# Patient Record
Sex: MALE | Race: WHITE | Employment: FULL TIME | URBAN - METROPOLITAN AREA
[De-identification: names, ages, dates, MRNs, and addresses within clinical notes are randomized per-mention and may not be internally consistent; named-entity substitution may affect disease eponyms.]

---

## 2018-07-13 ENCOUNTER — OFFICE VISIT (OUTPATIENT)
Dept: FAMILY MEDICINE CLINIC | Age: 33
End: 2018-07-13

## 2018-07-13 VITALS
DIASTOLIC BLOOD PRESSURE: 78 MMHG | SYSTOLIC BLOOD PRESSURE: 102 MMHG | BODY MASS INDEX: 22.35 KG/M2 | HEART RATE: 88 BPM | RESPIRATION RATE: 16 BRPM | HEIGHT: 72 IN | WEIGHT: 165 LBS | OXYGEN SATURATION: 96 %

## 2018-07-13 DIAGNOSIS — Z00.00 ANNUAL PHYSICAL EXAM: Primary | ICD-10-CM

## 2018-07-13 DIAGNOSIS — G47.9 SLEEP DISTURBANCE: Primary | ICD-10-CM

## 2018-07-13 DIAGNOSIS — R10.9 CHRONIC ABDOMINAL PAIN: ICD-10-CM

## 2018-07-13 DIAGNOSIS — G89.29 CHRONIC ABDOMINAL PAIN: ICD-10-CM

## 2018-07-13 PROCEDURE — 99203 OFFICE O/P NEW LOW 30 MIN: CPT | Performed by: FAMILY MEDICINE

## 2018-07-13 ASSESSMENT — PATIENT HEALTH QUESTIONNAIRE - PHQ9
5. POOR APPETITE OR OVEREATING: 3
6. FEELING BAD ABOUT YOURSELF - OR THAT YOU ARE A FAILURE OR HAVE LET YOURSELF OR YOUR FAMILY DOWN: 3
4. FEELING TIRED OR HAVING LITTLE ENERGY: 3
3. TROUBLE FALLING OR STAYING ASLEEP: 3
SUM OF ALL RESPONSES TO PHQ QUESTIONS 1-9: 18
9. THOUGHTS THAT YOU WOULD BE BETTER OFF DEAD, OR OF HURTING YOURSELF: 1
SUM OF ALL RESPONSES TO PHQ9 QUESTIONS 1 & 2: 5
1. LITTLE INTEREST OR PLEASURE IN DOING THINGS: 3
10. IF YOU CHECKED OFF ANY PROBLEMS, HOW DIFFICULT HAVE THESE PROBLEMS MADE IT FOR YOU TO DO YOUR WORK, TAKE CARE OF THINGS AT HOME, OR GET ALONG WITH OTHER PEOPLE: 1
8. MOVING OR SPEAKING SO SLOWLY THAT OTHER PEOPLE COULD HAVE NOTICED. OR THE OPPOSITE, BEING SO FIGETY OR RESTLESS THAT YOU HAVE BEEN MOVING AROUND A LOT MORE THAN USUAL: 0
2. FEELING DOWN, DEPRESSED OR HOPELESS: 2
7. TROUBLE CONCENTRATING ON THINGS, SUCH AS READING THE NEWSPAPER OR WATCHING TELEVISION: 0

## 2018-07-13 ASSESSMENT — ENCOUNTER SYMPTOMS
ABDOMINAL PAIN: 1
ABDOMINAL DISTENTION: 1
NAUSEA: 1

## 2018-07-13 NOTE — PROGRESS NOTES
7/13/2018    This is a 33 y.o. male   Chief Complaint   Patient presents with    Established New Doctor     Depression, Insomnia and has had pain and nausea in stomach has been to [facility] about 5 years ago for this but is getting bad again   . HPI  Pt presents today as a new pt to establish a PCP. His past medical history includes allergic rhinitis, anxiety, bipolar disorder, chronic back pain, depression, GERD. States that he has issues falling and staying asleep, averages 2-3 hours of sleep. Has tried melatonin a few times but doesn't want to be dependent on it. Also states that he has abdominal pain from childhood that came and went, then in from 2010 to 2015 was dx with leaky gut syndrome and went on a strict diet but still felt sick, was told his body is not able to absorb certain nutrients and that his body is in a mild fight or flight mode, had improvement starting in 2015 (had changed jobs to a less stress job), worse over the last 6 months and also having some relationship anxiety. Feels a mild queasiness to punched in the stomach, mostly mild queasy. In 2010 XRAY showed that his stomach was very superior and that his colon was swollen, had colonoscopy which was negative in 2010, also negative EGD in 2011. Also states that he has had blotchy rashes on his stomach, legs, and arms. Last occurence was in 2014. Also has had periodic testicular pain since his teenage years. [address]  which incorporates endocrinology and is seeing if his insurance will cover the 60 panel lab they want to do.     Past Medical History:   Diagnosis Date    Allergic rhinitis     Anxiety     Bipolar disorder (HCC)     Chronic back pain     Depression     GERD (gastroesophageal reflux disease)     Headache        Past Surgical History:   Procedure Laterality Date    WISDOM TOOTH EXTRACTION  2013       Social History     Social History    Marital status: Unknown     Spouse name: N/A    Number of children: N/A    Years of education: N/A     Occupational History    Not on file. Social History Main Topics    Smoking status: Never Smoker    Smokeless tobacco: Never Used    Alcohol use Yes      Comment: 2-3 drinks    Drug use: No    Sexual activity: No     Other Topics Concern    Not on file     Social History Narrative    No narrative on file       Family History   Problem Relation Age of Onset    Arthritis Mother     Other Mother     Diabetes Father        No current outpatient prescriptions on file. No current facility-administered medications for this visit. There is no immunization history on file for this patient. Allergies   Allergen Reactions    Penicillins     Benadryl [Diphenhydramine] Nausea And Vomiting and Rash       No results found for any previous visit. Review of Systems   Constitutional: Positive for appetite change. Gastrointestinal: Positive for abdominal distention (periodic bloating), abdominal pain (mostly mild queasiness ) and nausea. Genitourinary: Positive for testicular pain (every few months since teenage years). Psychiatric/Behavioral: Positive for dysphoric mood and sleep disturbance. /78 (Site: Left Arm, Position: Sitting, Cuff Size: Medium Adult)   Pulse 88   Resp 16   Ht 6' (1.829 m)   Wt 165 lb (74.8 kg)   SpO2 96%   BMI 22.38 kg/m²     Physical Exam   Constitutional: He is oriented to person, place, and time. He appears well-developed and well-nourished. HENT:   Head: Normocephalic and atraumatic. Eyes: EOM are normal. Pupils are equal, round, and reactive to light. Neck: Normal range of motion. Cardiovascular: Normal rate, regular rhythm and normal heart sounds. Pulmonary/Chest: Effort normal and breath sounds normal.   Abdominal: Bowel sounds are normal. There is no tenderness. Neurological: He is alert and oriented to person, place, and time. Psychiatric: He has a normal mood and affect.  His behavior is normal. Judgment and thought content normal.       Plan   Diagnosis Orders   1. Sleep disturbance  Wayne Baltazar MD   2.  Chronic abdominal pain         Return in about 2 months (around 9/13/2018) for Physical Exam.    Prior to Visit Medications    Not on File

## 2018-11-02 ENCOUNTER — OFFICE VISIT (OUTPATIENT)
Dept: PULMONOLOGY | Age: 33
End: 2018-11-02
Payer: COMMERCIAL

## 2018-11-02 VITALS
TEMPERATURE: 97.5 F | HEIGHT: 72 IN | SYSTOLIC BLOOD PRESSURE: 119 MMHG | WEIGHT: 173 LBS | HEART RATE: 67 BPM | OXYGEN SATURATION: 95 % | RESPIRATION RATE: 16 BRPM | BODY MASS INDEX: 23.43 KG/M2 | DIASTOLIC BLOOD PRESSURE: 78 MMHG

## 2018-11-02 DIAGNOSIS — G47.10 HYPERSOMNIA: ICD-10-CM

## 2018-11-02 DIAGNOSIS — R06.83 SNORING: Primary | ICD-10-CM

## 2018-11-02 DIAGNOSIS — F51.04 PSYCHOPHYSIOLOGICAL INSOMNIA: ICD-10-CM

## 2018-11-02 DIAGNOSIS — Z72.821 POOR SLEEP HYGIENE: ICD-10-CM

## 2018-11-02 DIAGNOSIS — R53.83 OTHER FATIGUE: ICD-10-CM

## 2018-11-02 PROCEDURE — 99243 OFF/OP CNSLTJ NEW/EST LOW 30: CPT | Performed by: NURSE PRACTITIONER

## 2018-11-02 ASSESSMENT — SLEEP AND FATIGUE QUESTIONNAIRES
HOW LIKELY ARE YOU TO NOD OFF OR FALL ASLEEP WHILE SITTING AND READING: 1
HOW LIKELY ARE YOU TO NOD OFF OR FALL ASLEEP WHILE SITTING AND TALKING TO SOMEONE: 0
HOW LIKELY ARE YOU TO NOD OFF OR FALL ASLEEP WHILE SITTING INACTIVE IN A PUBLIC PLACE: 0
HOW LIKELY ARE YOU TO NOD OFF OR FALL ASLEEP WHILE SITTING AND TALKING TO SOMEONE: 0
HOW LIKELY ARE YOU TO NOD OFF OR FALL ASLEEP IN A CAR, WHILE STOPPED FOR A FEW MINUTES IN TRAFFIC: 2
HOW LIKELY ARE YOU TO NOD OFF OR FALL ASLEEP WHILE WATCHING TV: 1
HOW LIKELY ARE YOU TO NOD OFF OR FALL ASLEEP WHILE SITTING QUIETLY AFTER LUNCH WITHOUT ALCOHOL: 0
ESS TOTAL SCORE: 7
HOW LIKELY ARE YOU TO NOD OFF OR FALL ASLEEP WHILE SITTING INACTIVE IN A PUBLIC PLACE: 2
HOW LIKELY ARE YOU TO NOD OFF OR FALL ASLEEP WHILE LYING DOWN TO REST IN THE AFTERNOON WHEN CIRCUMSTANCES PERMIT: 1
HOW LIKELY ARE YOU TO NOD OFF OR FALL ASLEEP WHILE SITTING AND READING: 0
ESS TOTAL SCORE: 4
HOW LIKELY ARE YOU TO NOD OFF OR FALL ASLEEP WHEN YOU ARE A PASSENGER IN A CAR FOR AN HOUR WITHOUT A BREAK: 1
HOW LIKELY ARE YOU TO NOD OFF OR FALL ASLEEP WHILE SITTING QUIETLY AFTER LUNCH WITHOUT ALCOHOL: 0
HOW LIKELY ARE YOU TO NOD OFF OR FALL ASLEEP WHEN YOU ARE A PASSENGER IN A CAR FOR AN HOUR WITHOUT A BREAK: 1
HOW LIKELY ARE YOU TO NOD OFF OR FALL ASLEEP WHILE LYING DOWN TO REST IN THE AFTERNOON WHEN CIRCUMSTANCES PERMIT: 1
HOW LIKELY ARE YOU TO NOD OFF OR FALL ASLEEP IN A CAR, WHILE STOPPED FOR A FEW MINUTES IN TRAFFIC: 1
HOW LIKELY ARE YOU TO NOD OFF OR FALL ASLEEP WHILE WATCHING TV: 0
NECK CIRCUMFERENCE (INCHES): 14.25

## 2018-11-02 NOTE — PROGRESS NOTES
supraclavicular LAD. M/S: No cyanosis. No obvious joint deformity. Neuro: Awake. Alert. Moves all four extremities. Psych: Oriented x 3. No anxiety. DATA:       Assessment:       · Snoring  · Hypersomnia   · Fatigue  · Sleep onset and maintenance insomnia-rule out LYNNETTE, poor sleep hygiene, depression/anxiety likely contributing        Plan:      · HST to evaluate for sleep related breathing disorder. · Treatment options were discussed with patient if PSG reveals LYNNETTE, including CPAP therapy, oral appliances and upper airway surgery. · Sleep hygiene  · Cognitive behavioral therapy was discussed with patient including stimulus control and sleep restriction  · Follow-up with PCP for depression/anxiety  · No phone in bed, decrease time in bed, set bed/wake times  · Avoid sedatives, alcohol and caffeinated drinks at bed time. · No driving motorized vehicles or operating heavy machinery while fatigue, drowsy or sleepy. · Weight loss is also recommended as a long-term intervention. · Complications of LYNNETTE if not treated were discussed with patient patient to include systemic hypertension, pulmonary hypertension, cardiovascular morbidities, car accidents and all cause mortality. Discussed pathophysiology of LYNNETTE with patient today- video shown in office.   Patient education handout provided regarding sleep tips   Patient declined flu shot today

## 2018-11-19 ENCOUNTER — TELEPHONE (OUTPATIENT)
Dept: PULMONOLOGY | Age: 33
End: 2018-11-19

## 2019-02-19 ENCOUNTER — OFFICE VISIT (OUTPATIENT)
Dept: FAMILY MEDICINE CLINIC | Age: 34
End: 2019-02-19
Payer: COMMERCIAL

## 2019-02-19 ENCOUNTER — HOSPITAL ENCOUNTER (OUTPATIENT)
Dept: ULTRASOUND IMAGING | Age: 34
Discharge: HOME | End: 2019-02-19
Payer: COMMERCIAL

## 2019-02-19 VITALS
HEIGHT: 72 IN | SYSTOLIC BLOOD PRESSURE: 104 MMHG | OXYGEN SATURATION: 94 % | HEART RATE: 76 BPM | WEIGHT: 169 LBS | RESPIRATION RATE: 14 BRPM | DIASTOLIC BLOOD PRESSURE: 70 MMHG | BODY MASS INDEX: 22.89 KG/M2

## 2019-02-19 DIAGNOSIS — L98.9 LESION OF SUBCUTANEOUS TISSUE: ICD-10-CM

## 2019-02-19 DIAGNOSIS — R93.89 ABNORMAL ULTRASOUND: Primary | ICD-10-CM

## 2019-02-19 DIAGNOSIS — N50.819 TESTICULAR PAIN: ICD-10-CM

## 2019-02-19 DIAGNOSIS — N50.819 TESTICULAR PAIN: Primary | ICD-10-CM

## 2019-02-19 PROCEDURE — 76870 US EXAM SCROTUM: CPT

## 2019-02-19 PROCEDURE — 99214 OFFICE O/P EST MOD 30 MIN: CPT | Performed by: FAMILY MEDICINE

## 2019-02-19 ASSESSMENT — PATIENT HEALTH QUESTIONNAIRE - PHQ9
1. LITTLE INTEREST OR PLEASURE IN DOING THINGS: 0
SUM OF ALL RESPONSES TO PHQ9 QUESTIONS 1 & 2: 0
2. FEELING DOWN, DEPRESSED OR HOPELESS: 0
SUM OF ALL RESPONSES TO PHQ QUESTIONS 1-9: 0
SUM OF ALL RESPONSES TO PHQ QUESTIONS 1-9: 0

## 2020-02-28 ENCOUNTER — OFFICE VISIT (OUTPATIENT)
Dept: FAMILY MEDICINE CLINIC | Age: 35
End: 2020-02-28
Payer: COMMERCIAL

## 2020-02-28 VITALS
DIASTOLIC BLOOD PRESSURE: 77 MMHG | SYSTOLIC BLOOD PRESSURE: 120 MMHG | BODY MASS INDEX: 23.98 KG/M2 | HEIGHT: 72 IN | RESPIRATION RATE: 16 BRPM | OXYGEN SATURATION: 94 % | TEMPERATURE: 98.5 F | HEART RATE: 79 BPM | WEIGHT: 177 LBS

## 2020-02-28 PROCEDURE — 99213 OFFICE O/P EST LOW 20 MIN: CPT | Performed by: FAMILY MEDICINE

## 2020-02-28 RX ORDER — BUSPIRONE HYDROCHLORIDE 5 MG/1
5 TABLET ORAL 2 TIMES DAILY
Qty: 60 TABLET | Refills: 0 | Status: SHIPPED | OUTPATIENT
Start: 2020-02-28 | End: 2020-03-25

## 2020-02-28 ASSESSMENT — PATIENT HEALTH QUESTIONNAIRE - PHQ9
SUM OF ALL RESPONSES TO PHQ9 QUESTIONS 1 & 2: 2
SUM OF ALL RESPONSES TO PHQ QUESTIONS 1-9: 2
1. LITTLE INTEREST OR PLEASURE IN DOING THINGS: 1
SUM OF ALL RESPONSES TO PHQ QUESTIONS 1-9: 2
2. FEELING DOWN, DEPRESSED OR HOPELESS: 1

## 2020-02-28 ASSESSMENT — ENCOUNTER SYMPTOMS: ABDOMINAL PAIN: 1

## 2020-02-28 NOTE — PROGRESS NOTES
2/28/2020    This is a 36 y.o. male   Chief Complaint   Patient presents with    Anxiety   . HPI  Patient presents today for anxiety. Is currently taking no medication. States today that he has dealt with depression off and on since being a teenager, within the last year has had more anxiety and some anxiety attacks, in bed also with hyperventilating and sick stomach, 2 years ago he had chronic gut issues and had testing that showed him anxious at rest. Can't sleep. Admits to having always worried about things, also worrying runs in the family, depression runs on father's side of family. Denies SI or thoughts of hurting others. Formerly took walks in the woods and wrote music, also has tried breathing. Has melatonin, CBD oil, and magnesium, and essential oils without success.     Past Medical History:   Diagnosis Date    Allergic rhinitis     Anxiety     Bipolar disorder (HCC)     Chronic back pain     Depression     GERD (gastroesophageal reflux disease)     Headache        Past Surgical History:   Procedure Laterality Date    WISDOM TOOTH EXTRACTION  2013       Social History     Socioeconomic History    Marital status: Unknown     Spouse name: Not on file    Number of children: Not on file    Years of education: Not on file    Highest education level: Not on file   Occupational History    Not on file   Social Needs    Financial resource strain: Not on file    Food insecurity:     Worry: Not on file     Inability: Not on file    Transportation needs:     Medical: Not on file     Non-medical: Not on file   Tobacco Use    Smoking status: Never Smoker    Smokeless tobacco: Never Used   Substance and Sexual Activity    Alcohol use: Yes     Comment: 2-3 drinks    Drug use: No    Sexual activity: Never   Lifestyle    Physical activity:     Days per week: Not on file     Minutes per session: Not on file    Stress: Not on file   Relationships    Social connections:     Talks on phone: Not on file     Gets together: Not on file     Attends Caodaism service: Not on file     Active member of club or organization: Not on file     Attends meetings of clubs or organizations: Not on file     Relationship status: Not on file    Intimate partner violence:     Fear of current or ex partner: Not on file     Emotionally abused: Not on file     Physically abused: Not on file     Forced sexual activity: Not on file   Other Topics Concern    Not on file   Social History Narrative    Not on file       Family History   Problem Relation Age of Onset    Arthritis Mother     Other Mother     Diabetes Father        Current Outpatient Medications   Medication Sig Dispense Refill    busPIRone (BUSPAR) 5 MG tablet Take 1 tablet by mouth 2 times daily 60 tablet 0     No current facility-administered medications for this visit. There is no immunization history on file for this patient. Allergies   Allergen Reactions    Penicillins     Benadryl [Diphenhydramine] Nausea And Vomiting and Rash       No results found for any previous visit. Review of Systems   Gastrointestinal: Positive for abdominal pain (when anxious). Psychiatric/Behavioral: Positive for dysphoric mood and sleep disturbance. The patient is nervous/anxious. /77 (Site: Left Upper Arm, Position: Sitting, Cuff Size: Medium Adult)   Pulse 79   Temp 98.5 °F (36.9 °C)   Resp 16   Ht 6' (1.829 m)   Wt 177 lb (80.3 kg)   SpO2 94%   BMI 24.01 kg/m²     Physical Exam  Vitals signs reviewed. Constitutional:       Appearance: He is well-developed. HENT:      Head: Normocephalic and atraumatic. Eyes:      Pupils: Pupils are equal, round, and reactive to light. Neck:      Musculoskeletal: Normal range of motion. Cardiovascular:      Rate and Rhythm: Normal rate and regular rhythm. Heart sounds: Normal heart sounds. Pulmonary:      Effort: Pulmonary effort is normal.      Breath sounds: Normal breath sounds.  No wheezing. Abdominal:      General: Bowel sounds are normal.      Tenderness: There is no abdominal tenderness. Neurological:      Mental Status: He is alert and oriented to person, place, and time. Psychiatric:         Behavior: Behavior normal.         Thought Content: Thought content normal.         Judgment: Judgment normal.         Plan   Diagnosis Orders   1. Anxiety  busPIRone (BUSPAR) 5 MG tablet     Potential SE's explained to pt. Return in about 3 weeks (around 3/20/2020) for Anxiety/Buspar F/U. Prior to Visit Medications    Medication Sig Taking?  Authorizing Provider   busPIRone (BUSPAR) 5 MG tablet Take 1 tablet by mouth 2 times daily Yes Ed La,

## 2020-12-15 ENCOUNTER — OFFICE VISIT (OUTPATIENT)
Dept: FAMILY MEDICINE CLINIC | Age: 35
End: 2020-12-15
Payer: COMMERCIAL

## 2020-12-15 ENCOUNTER — TELEPHONE (OUTPATIENT)
Dept: FAMILY MEDICINE CLINIC | Age: 35
End: 2020-12-15

## 2020-12-15 VITALS
DIASTOLIC BLOOD PRESSURE: 76 MMHG | OXYGEN SATURATION: 96 % | WEIGHT: 182 LBS | RESPIRATION RATE: 16 BRPM | HEART RATE: 78 BPM | TEMPERATURE: 98.7 F | SYSTOLIC BLOOD PRESSURE: 128 MMHG | BODY MASS INDEX: 24.68 KG/M2

## 2020-12-15 DIAGNOSIS — N50.819 TESTICLE TENDERNESS: Primary | ICD-10-CM

## 2020-12-15 PROCEDURE — 99214 OFFICE O/P EST MOD 30 MIN: CPT | Performed by: FAMILY MEDICINE

## 2020-12-15 RX ORDER — LIDOCAINE AND PRILOCAINE 25; 25 MG/G; MG/G
CREAM TOPICAL
Qty: 1 TUBE | Refills: 1 | Status: SHIPPED | OUTPATIENT
Start: 2020-12-15 | End: 2021-04-13 | Stop reason: ALTCHOICE

## 2020-12-15 RX ORDER — BUSPIRONE HYDROCHLORIDE 5 MG/1
TABLET ORAL
Qty: 60 TABLET | Refills: 3 | Status: SHIPPED | OUTPATIENT
Start: 2020-12-15 | End: 2021-04-13 | Stop reason: ALTCHOICE

## 2020-12-15 ASSESSMENT — PATIENT HEALTH QUESTIONNAIRE - PHQ9
SUM OF ALL RESPONSES TO PHQ QUESTIONS 1-9: 0
SUM OF ALL RESPONSES TO PHQ QUESTIONS 1-9: 0
SUM OF ALL RESPONSES TO PHQ9 QUESTIONS 1 & 2: 0
1. LITTLE INTEREST OR PLEASURE IN DOING THINGS: 0
SUM OF ALL RESPONSES TO PHQ QUESTIONS 1-9: 0
2. FEELING DOWN, DEPRESSED OR HOPELESS: 0

## 2020-12-15 ASSESSMENT — ENCOUNTER SYMPTOMS
BACK PAIN: 1
ABDOMINAL PAIN: 0

## 2020-12-15 NOTE — PROGRESS NOTES
12/15/2020    This is a 30 y.o. male   Chief Complaint   Patient presents with    Referral - General     wants for Physical Therapy - neck, back, shoulder pain - constant - from working from home   . HPI  Patient presents today seeking a referral for physical therapy for neck, back, and shoulder pain that is constant from working at home, Admits to periodic bilateral sciatica. Also states that he formerly was on medical pain for left testicular pain, pain described as throbbing and sometimes sharp, had US on 02/1919. Saw urology and was prescribed an unknown medication, took for 2 weeks and was told to take for 2 months.  Denies abdominal pain or f/c  Past Medical History:   Diagnosis Date    Allergic rhinitis     Anxiety     Bipolar disorder (HCC)     Chronic back pain     Depression     GERD (gastroesophageal reflux disease)     Headache        Past Surgical History:   Procedure Laterality Date    WISDOM TOOTH EXTRACTION  2013       Social History     Socioeconomic History    Marital status: Unknown     Spouse name: Not on file    Number of children: Not on file    Years of education: Not on file    Highest education level: Not on file   Occupational History    Not on file   Social Needs    Financial resource strain: Not on file    Food insecurity     Worry: Not on file     Inability: Not on file    Transportation needs     Medical: Not on file     Non-medical: Not on file   Tobacco Use    Smoking status: Never Smoker    Smokeless tobacco: Never Used   Substance and Sexual Activity    Alcohol use: Yes     Comment: 2-3 drinks    Drug use: No    Sexual activity: Never   Lifestyle    Physical activity     Days per week: Not on file     Minutes per session: Not on file    Stress: Not on file   Relationships    Social connections     Talks on phone: Not on file     Gets together: Not on file     Attends Buddhism service: Not on file     Active member of club or organization: Not on file Attends meetings of clubs or organizations: Not on file     Relationship status: Not on file    Intimate partner violence     Fear of current or ex partner: Not on file     Emotionally abused: Not on file     Physically abused: Not on file     Forced sexual activity: Not on file   Other Topics Concern    Not on file   Social History Narrative    Not on file       Family History   Problem Relation Age of Onset    Arthritis Mother     Other Mother     Diabetes Father        Current Outpatient Medications   Medication Sig Dispense Refill    busPIRone (BUSPAR) 5 MG tablet TAKE 1 TABLET BY MOUTH TWICE A DAY 60 tablet 3    lidocaine-prilocaine (EMLA) 2.5-2.5 % cream Apply topically as needed. 1 Tube 1     No current facility-administered medications for this visit. There is no immunization history on file for this patient. Allergies   Allergen Reactions    Penicillins     Benadryl [Diphenhydramine] Nausea And Vomiting and Rash       No results found for any previous visit. Review of Systems   Constitutional: Negative for chills and fever. Gastrointestinal: Negative for abdominal pain. Genitourinary: Positive for testicular pain. Musculoskeletal: Positive for arthralgias (shoulders), back pain and neck pain. /76 (Site: Left Upper Arm, Position: Sitting)   Pulse 78   Temp 98.7 °F (37.1 °C) (Temporal)   Resp 16   Wt 182 lb (82.6 kg)   SpO2 96%   BMI 24.68 kg/m²     Physical Exam  Vitals signs reviewed. Exam conducted with a chaperone present. Constitutional:       Appearance: He is well-developed. HENT:      Head: Normocephalic and atraumatic. Eyes:      Pupils: Pupils are equal, round, and reactive to light. Neck:      Musculoskeletal: Normal range of motion. Cardiovascular:      Rate and Rhythm: Normal rate and regular rhythm. Heart sounds: Normal heart sounds.    Pulmonary:      Effort: Pulmonary effort is normal.      Breath sounds: Normal breath sounds. Abdominal:      General: Bowel sounds are normal.      Tenderness: There is no abdominal tenderness. Genitourinary:     Comments: R testicle mildly elongated, negative for inguinal hernias, mild left testicular tenderness. Neurological:      Mental Status: He is alert and oriented to person, place, and time. Psychiatric:         Behavior: Behavior normal.         Thought Content: Thought content normal.         Judgment: Judgment normal.         Plan   Diagnosis Orders   1. Chronic neck pain  lidocaine-prilocaine (EMLA) 2.5-2.5 % cream    External Referral To Physical Therapy   2. Anxiety  busPIRone (BUSPAR) 5 MG tablet    TSH without Reflex    Lipid Panel    CBC Auto Differential    Comprehensive Metabolic Panel   3. Chronic pain of both shoulders  lidocaine-prilocaine (EMLA) 2.5-2.5 % cream    External Referral To Physical Therapy   4. Chronic bilateral low back pain with bilateral sciatica  External Referral To Physical Therapy     Encouraged pt to stretch and periodically note his position at his computer. Return in about 2 months (around 2/15/2021) for Physical Exam.    Prior to Visit Medications    Medication Sig Taking? Authorizing Provider   busPIRone (BUSPAR) 5 MG tablet TAKE 1 TABLET BY MOUTH TWICE A DAY Yes Arcadio Ramirez, DO   lidocaine-prilocaine (EMLA) 2.5-2.5 % cream Apply topically as needed.  Yes Vicki Barker, DO

## 2021-01-06 NOTE — TELEPHONE ENCOUNTER
I spoke to office and the last medication that was prescribed for patient was 03/2019 Bactrim DS 800mg BID. I hope this is what you were looking for?

## 2021-01-11 RX ORDER — SULFAMETHOXAZOLE AND TRIMETHOPRIM 800; 160 MG/1; MG/1
1 TABLET ORAL 2 TIMES DAILY
Qty: 20 TABLET | Refills: 0 | Status: SHIPPED | OUTPATIENT
Start: 2021-01-11 | End: 2021-01-21

## 2021-01-11 NOTE — TELEPHONE ENCOUNTER
Patient anna like to start the Bactrim again.   This is a long term issue but the Bactrim seems to help

## 2021-02-10 ENCOUNTER — NURSE ONLY (OUTPATIENT)
Dept: FAMILY MEDICINE CLINIC | Age: 36
End: 2021-02-10

## 2021-02-10 VITALS — TEMPERATURE: 99.5 F

## 2021-02-10 DIAGNOSIS — F41.9 ANXIETY: ICD-10-CM

## 2021-02-10 LAB
A/G RATIO: 1.6 (ref 1.1–2.2)
ALBUMIN SERPL-MCNC: 4.7 G/DL (ref 3.4–5)
ALP BLD-CCNC: 87 U/L (ref 40–129)
ALT SERPL-CCNC: 36 U/L (ref 10–40)
ANION GAP SERPL CALCULATED.3IONS-SCNC: 8 MMOL/L (ref 3–16)
AST SERPL-CCNC: 34 U/L (ref 15–37)
BASOPHILS ABSOLUTE: 0 K/UL (ref 0–0.2)
BASOPHILS RELATIVE PERCENT: 0.7 %
BILIRUB SERPL-MCNC: 0.5 MG/DL (ref 0–1)
BUN BLDV-MCNC: 13 MG/DL (ref 7–20)
CALCIUM SERPL-MCNC: 9.7 MG/DL (ref 8.3–10.6)
CHLORIDE BLD-SCNC: 100 MMOL/L (ref 99–110)
CHOLESTEROL, TOTAL: 191 MG/DL (ref 0–199)
CO2: 29 MMOL/L (ref 21–32)
CREAT SERPL-MCNC: 1 MG/DL (ref 0.9–1.3)
EOSINOPHILS ABSOLUTE: 0.1 K/UL (ref 0–0.6)
EOSINOPHILS RELATIVE PERCENT: 2.2 %
GFR AFRICAN AMERICAN: >60
GFR NON-AFRICAN AMERICAN: >60
GLOBULIN: 2.9 G/DL
GLUCOSE BLD-MCNC: 89 MG/DL (ref 70–99)
HCT VFR BLD CALC: 46.2 % (ref 40.5–52.5)
HDLC SERPL-MCNC: 46 MG/DL (ref 40–60)
HEMOGLOBIN: 15.5 G/DL (ref 13.5–17.5)
LDL CHOLESTEROL CALCULATED: 122 MG/DL
LYMPHOCYTES ABSOLUTE: 1.9 K/UL (ref 1–5.1)
LYMPHOCYTES RELATIVE PERCENT: 32.2 %
MCH RBC QN AUTO: 29.5 PG (ref 26–34)
MCHC RBC AUTO-ENTMCNC: 33.6 G/DL (ref 31–36)
MCV RBC AUTO: 87.7 FL (ref 80–100)
MONOCYTES ABSOLUTE: 0.6 K/UL (ref 0–1.3)
MONOCYTES RELATIVE PERCENT: 9.7 %
NEUTROPHILS ABSOLUTE: 3.3 K/UL (ref 1.7–7.7)
NEUTROPHILS RELATIVE PERCENT: 55.2 %
PDW BLD-RTO: 12.9 % (ref 12.4–15.4)
PLATELET # BLD: 299 K/UL (ref 135–450)
PMV BLD AUTO: 7.8 FL (ref 5–10.5)
POTASSIUM SERPL-SCNC: 4.7 MMOL/L (ref 3.5–5.1)
RBC # BLD: 5.26 M/UL (ref 4.2–5.9)
SODIUM BLD-SCNC: 137 MMOL/L (ref 136–145)
TOTAL PROTEIN: 7.6 G/DL (ref 6.4–8.2)
TRIGL SERPL-MCNC: 115 MG/DL (ref 0–150)
TSH SERPL DL<=0.05 MIU/L-ACNC: 3.13 UIU/ML (ref 0.27–4.2)
VLDLC SERPL CALC-MCNC: 23 MG/DL
WBC # BLD: 5.9 K/UL (ref 4–11)

## 2021-04-13 ENCOUNTER — OFFICE VISIT (OUTPATIENT)
Dept: FAMILY MEDICINE CLINIC | Age: 36
End: 2021-04-13
Payer: COMMERCIAL

## 2021-04-13 VITALS
HEART RATE: 80 BPM | TEMPERATURE: 98.4 F | WEIGHT: 186 LBS | BODY MASS INDEX: 26.04 KG/M2 | RESPIRATION RATE: 16 BRPM | SYSTOLIC BLOOD PRESSURE: 110 MMHG | OXYGEN SATURATION: 96 % | DIASTOLIC BLOOD PRESSURE: 82 MMHG | HEIGHT: 71 IN

## 2021-04-13 DIAGNOSIS — M54.2 CHRONIC NECK PAIN: ICD-10-CM

## 2021-04-13 DIAGNOSIS — G47.9 SLEEP DISTURBANCE: ICD-10-CM

## 2021-04-13 DIAGNOSIS — G89.29 CHRONIC NECK PAIN: ICD-10-CM

## 2021-04-13 DIAGNOSIS — Z00.00 ANNUAL PHYSICAL EXAM: Primary | ICD-10-CM

## 2021-04-13 PROCEDURE — 99395 PREV VISIT EST AGE 18-39: CPT | Performed by: FAMILY MEDICINE

## 2021-04-13 RX ORDER — ASCORBIC ACID 100 MG
TABLET,CHEWABLE ORAL
COMMUNITY

## 2021-04-13 SDOH — ECONOMIC STABILITY: TRANSPORTATION INSECURITY
IN THE PAST 12 MONTHS, HAS LACK OF TRANSPORTATION KEPT YOU FROM MEETINGS, WORK, OR FROM GETTING THINGS NEEDED FOR DAILY LIVING?: NO

## 2021-04-13 SDOH — ECONOMIC STABILITY: TRANSPORTATION INSECURITY
IN THE PAST 12 MONTHS, HAS THE LACK OF TRANSPORTATION KEPT YOU FROM MEDICAL APPOINTMENTS OR FROM GETTING MEDICATIONS?: NO

## 2021-04-13 SDOH — ECONOMIC STABILITY: FOOD INSECURITY: WITHIN THE PAST 12 MONTHS, YOU WORRIED THAT YOUR FOOD WOULD RUN OUT BEFORE YOU GOT MONEY TO BUY MORE.: NEVER TRUE

## 2021-04-13 SDOH — ECONOMIC STABILITY: FOOD INSECURITY: WITHIN THE PAST 12 MONTHS, THE FOOD YOU BOUGHT JUST DIDN'T LAST AND YOU DIDN'T HAVE MONEY TO GET MORE.: NEVER TRUE

## 2021-04-13 ASSESSMENT — PATIENT HEALTH QUESTIONNAIRE - PHQ9
2. FEELING DOWN, DEPRESSED OR HOPELESS: 0
SUM OF ALL RESPONSES TO PHQ QUESTIONS 1-9: 0
SUM OF ALL RESPONSES TO PHQ QUESTIONS 1-9: 0

## 2021-04-13 ASSESSMENT — ENCOUNTER SYMPTOMS
BACK PAIN: 1
SHORTNESS OF BREATH: 0
ABDOMINAL PAIN: 0

## 2021-04-13 NOTE — PROGRESS NOTES
Salma Wilson  YOB: 1985    Date of Service:  4/13/2021    Chief Complaint:   Salma Wilson is a 30 y.o. male who presents for complete physical examination. HPI:  HPI    Self-testicular exams: Yes  Sexual activity: has sex with females   Last eye exam: 1- 1.5 years ago,abnormal - new glasses  Exercise: running 2 times a week  Seatbelt use: yes  Are You a Spiritual person: yes  Living will: no    Wt Readings from Last 3 Encounters:   04/13/21 186 lb (84.4 kg)   12/15/20 182 lb (82.6 kg)   02/28/20 177 lb (80.3 kg)     BP Readings from Last 3 Encounters:   04/13/21 110/82   12/15/20 128/76   02/28/20 120/77       Patient Active Problem List   Diagnosis    Sleep disturbance    Chronic abdominal pain       Health Maintenance   Topic Date Due    Hepatitis C screen  Never done    Varicella vaccine (1 of 2 - 2-dose childhood series) Never done    HIV screen  Never done    COVID-19 Vaccine (1) Never done    DTaP/Tdap/Td vaccine (1 - Tdap) Never done    Flu vaccine (Season Ended) 09/01/2021    Hepatitis A vaccine  Aged Out    Hepatitis B vaccine  Aged Out    Hib vaccine  Aged Out    Meningococcal (ACWY) vaccine  Aged Out    Pneumococcal 0-64 years Vaccine  Aged Out         There is no immunization history on file for this patient.     Allergies   Allergen Reactions    Penicillins     Benadryl [Diphenhydramine] Nausea And Vomiting and Rash     Outpatient Medications Marked as Taking for the 4/13/21 encounter (Office Visit) with Loyda Goddard, DO   Medication Sig Dispense Refill    Ascorbic Acid (VITAMIN C) 100 MG CHEW Take by mouth         Past Medical History:   Diagnosis Date    Allergic rhinitis     Anxiety     Bipolar disorder ([address]     Chronic back pain     Depression     GERD (gastroesophageal reflux disease)     Headache      Past Surgical History:   Procedure Laterality Date    WISDOM TOOTH EXTRACTION  2013     Family History   Problem Relation Age of Onset    Arthritis Mother     Other Mother     Diabetes Father      Social History     Socioeconomic History    Marital status:      Spouse name: Not on file    Number of children: Not on file    Years of education: Not on file    Highest education level: Not on file   Occupational History    Not on file   Social Needs    Financial resource strain: Not hard at all   [place] insecurity     Worry: Never true     Inability: Never true   [employer] needs     Medical: No     Non-medical: No   Tobacco Use    Smoking status: Never Smoker    Smokeless tobacco: Never Used   Substance and Sexual Activity    Alcohol use: Yes     Comment: 2-3 drinks    Drug use: No    Sexual activity: Never   Lifestyle    Physical activity     Days per week: Not on file     Minutes per session: Not on file    Stress: Not on file   Relationships    Social connections     Talks on phone: Not on file     Gets together: Not on file     Attends Catholic service: Not on file     Active member of club or organization: Not on file     Attends meetings of clubs or organizations: Not on file     Relationship status: Not on file    Intimate partner violence     Fear of current or ex partner: Not on file     Emotionally abused: Not on file     Physically abused: Not on file     Forced sexual activity: Not on file   Other Topics Concern    Not on file   Social History Narrative    Not on file       Review ofSystems:  Review of Systems   Constitutional: Negative for fatigue. HENT: Negative for congestion. Eyes: Negative for visual disturbance. Respiratory: Negative for shortness of breath. Positive for snoring   Cardiovascular: Negative for chest pain. Gastrointestinal: Negative for abdominal pain. Endocrine: Negative for cold intolerance and heat intolerance. Genitourinary: Negative for difficulty urinating. Musculoskeletal: Positive for back pain and neck pain. Skin: Negative for rash.    Allergic/Immunologic: Positive for environmental allergies. Neurological: Positive for dizziness (periodic) and headaches (periodic). Hematological: Does not bruise/bleed easily. Psychiatric/Behavioral: Positive for sleep disturbance. Negative for dysphoric mood. The patient is not nervous/anxious. PhysicalExam:   Vitals:    04/13/21 1026   BP: 110/82   Site: Left Upper Arm   Position: Sitting   Pulse: 80   Resp: 16   Temp: 98.4 °F (36.9 °C)   TempSrc: Temporal   SpO2: 96%   Weight: 186 lb (84.4 kg)   Height: 5' 11\" (1.803 m)     Body mass index is 25.94 kg/m². Physical Exam  Vitals signs reviewed. Constitutional:       Appearance: He is well-developed. HENT:      Head: Normocephalic and atraumatic. Right Ear: External ear normal.      Left Ear: External ear normal.      Nose: Nose normal.   Eyes:      Pupils: Pupils are equal, round, and reactive to light. Neck:      Musculoskeletal: Normal range of motion. Cardiovascular:      Rate and Rhythm: Normal rate and regular rhythm. Heart sounds: Normal heart sounds. Pulmonary:      Effort: Pulmonary effort is normal.      Breath sounds: Normal breath sounds. Abdominal:      General: Bowel sounds are normal.      Palpations: Abdomen is soft. Musculoskeletal: Normal range of motion. Skin:     General: Skin is warm and dry. Neurological:      Mental Status: He is alert and oriented to person, place, and time. Deep Tendon Reflexes: Reflexes are normal and symmetric. Psychiatric:         Behavior: Behavior normal.         Thought Content: Thought content normal.         Judgment: Judgment normal.         Assessment/Plan:   Diagnosis Orders   1. Annual physical exam     2. Chronic neck pain  Will be seeing a chiropractor   3. Sleep disturbance  Referral Pulmonology     Return in about 1 year (around 4/13/2022). Prior to Visit Medications    Medication Sig Taking?  Authorizing Provider   Ascorbic Acid (VITAMIN C) 100 MG CHEW Take by mouth Yes Historical Provider, MD

## 2021-05-28 ENCOUNTER — TELEPHONE (OUTPATIENT)
Dept: FAMILY MEDICINE CLINIC | Age: 36
End: 2021-05-28

## 2021-05-28 DIAGNOSIS — M54.6 THORACIC BACK PAIN, UNSPECIFIED BACK PAIN LATERALITY, UNSPECIFIED CHRONICITY: Primary | ICD-10-CM

## 2021-11-11 ENCOUNTER — PATIENT MESSAGE (OUTPATIENT)
Dept: FAMILY MEDICINE CLINIC | Age: 36
End: 2021-11-11

## 2021-11-11 NOTE — TELEPHONE ENCOUNTER
From: Vee Hall  To: Dr. Roman Brown  Sent: 11/11/2021 1:26 PM EST  Subject: Non-Urgent Medical Question    Hello,  I am trying to schedule an annual physical exam, but didn't see an option. Are those kinds of appointments available currently?      Thanks,  Alma Damon

## 2023-02-07 ENCOUNTER — PATIENT MESSAGE (OUTPATIENT)
Dept: FAMILY MEDICINE CLINIC | Age: 38
End: 2023-02-07

## 2023-02-08 NOTE — TELEPHONE ENCOUNTER
From: Odell Peralta  To: Dr. Valverde [facility]  Sent: 2/7/2023 5:42 PM EST  Subject: Refill of medicine I wasn't able to take    Hello. A while back I had been seen for testicular pain, and was prescribed an antibiotic. I was not able to take it all back then, and the pain has flared up again so I want to try to see it through this time. Would I be able to get that antibiotic re-prescribed without having to set up another appointment?    Thanks,  Unique Can

## 2023-02-23 ENCOUNTER — OFFICE VISIT (OUTPATIENT)
Dept: FAMILY MEDICINE CLINIC | Age: 38
End: 2023-02-23
Payer: COMMERCIAL

## 2023-02-23 VITALS
RESPIRATION RATE: 16 BRPM | OXYGEN SATURATION: 95 % | SYSTOLIC BLOOD PRESSURE: 128 MMHG | BODY MASS INDEX: 26.14 KG/M2 | TEMPERATURE: 97.3 F | HEIGHT: 72 IN | DIASTOLIC BLOOD PRESSURE: 87 MMHG | HEART RATE: 69 BPM | WEIGHT: 193 LBS

## 2023-02-23 DIAGNOSIS — M25.512 ACUTE PAIN OF LEFT SHOULDER: Primary | ICD-10-CM

## 2023-02-23 DIAGNOSIS — R10.32 LEFT INGUINAL PAIN: ICD-10-CM

## 2023-02-23 DIAGNOSIS — N50.812 LEFT TESTICULAR PAIN: ICD-10-CM

## 2023-02-23 PROCEDURE — 99214 OFFICE O/P EST MOD 30 MIN: CPT | Performed by: FAMILY MEDICINE

## 2023-02-23 RX ORDER — MELOXICAM 15 MG/1
15 TABLET ORAL DAILY
Qty: 5 TABLET | Refills: 0 | Status: SHIPPED | OUTPATIENT
Start: 2023-02-23

## 2023-02-23 RX ORDER — SULFAMETHOXAZOLE AND TRIMETHOPRIM 800; 160 MG/1; MG/1
1 TABLET ORAL 2 TIMES DAILY
Qty: 20 TABLET | Refills: 0 | Status: SHIPPED | OUTPATIENT
Start: 2023-02-23 | End: 2023-03-05

## 2023-02-23 SDOH — ECONOMIC STABILITY: HOUSING INSECURITY
IN THE LAST 12 MONTHS, WAS THERE A TIME WHEN YOU DID NOT HAVE A STEADY PLACE TO SLEEP OR SLEPT IN A SHELTER (INCLUDING NOW)?: NO

## 2023-02-23 SDOH — ECONOMIC STABILITY: FOOD INSECURITY: WITHIN THE PAST 12 MONTHS, THE FOOD YOU BOUGHT JUST DIDN'T LAST AND YOU DIDN'T HAVE MONEY TO GET MORE.: NEVER TRUE

## 2023-02-23 SDOH — ECONOMIC STABILITY: INCOME INSECURITY: HOW HARD IS IT FOR YOU TO PAY FOR THE VERY BASICS LIKE FOOD, HOUSING, MEDICAL CARE, AND HEATING?: NOT HARD AT ALL

## 2023-02-23 SDOH — ECONOMIC STABILITY: FOOD INSECURITY: WITHIN THE PAST 12 MONTHS, YOU WORRIED THAT YOUR FOOD WOULD RUN OUT BEFORE YOU GOT MONEY TO BUY MORE.: NEVER TRUE

## 2023-02-23 ASSESSMENT — PATIENT HEALTH QUESTIONNAIRE - PHQ9
2. FEELING DOWN, DEPRESSED OR HOPELESS: 0
SUM OF ALL RESPONSES TO PHQ QUESTIONS 1-9: 0
SUM OF ALL RESPONSES TO PHQ QUESTIONS 1-9: 0
1. LITTLE INTEREST OR PLEASURE IN DOING THINGS: 0
SUM OF ALL RESPONSES TO PHQ QUESTIONS 1-9: 0
SUM OF ALL RESPONSES TO PHQ QUESTIONS 1-9: 0
SUM OF ALL RESPONSES TO PHQ9 QUESTIONS 1 & 2: 0

## 2023-02-23 NOTE — PROGRESS NOTES
2/23/2023    This is a 40 y.o. male   Chief Complaint   Patient presents with    Follow-up     Testicular pain    . HPI   Presents today for left testicular pain follow-up. Has history of previous left testicular pain and was prescribed Bactrim twice daily x 10 days on January 11, 2021. Pain described as throbbing. Had [address] in 2019 and was referred to Urology, pt unsure if he went, sexual activity can be a trigger, sometimes hurts when at rest.    Also admits to left shoulder pain for the last 3 weeks, has been playing a lot of volleyball and spiking the ball, is left handed. Pain located at top of left should,  lateral raising cause pain      Past Medical History:   Diagnosis Date    Allergic rhinitis     Anxiety     Bipolar disorder (HCC)     Chronic back pain     Depression     GERD (gastroesophageal reflux disease)     Headache        Past Surgical History:   Procedure Laterality Date    WISDOM TOOTH EXTRACTION  2013       Social History     Socioeconomic History    Marital status:      Spouse name: Not on file    Number of children: Not on file    Years of education: Not on file    Highest education level: Not on file   Occupational History    Not on file   Tobacco Use    Smoking status: Never    Smokeless tobacco: Never   Vaping Use    Vaping Use: Never used   Substance and Sexual Activity    Alcohol use: Yes     Comment: 2-3 drinks    Drug use: No    Sexual activity: Never   Other Topics Concern    Not on file   Social History Narrative    Not on file     Social Determinants of Health     Financial Resource Strain: Low Risk     Difficulty of Paying Living Expenses: Not hard at all   Food Insecurity: No Food Insecurity    Worried About Running Out of Food in the Last Year: Never true    [address] in the Last Year: Never true   Transportation Needs: Unknown    Lack of Transportation (Medical): Not on file    Lack of Transportation (Non-Medical):  No   Physical Activity: Not on file   Stress: Not on file   Social Connections: Not on file   Intimate Partner Violence: Not on file   Housing Stability: Unknown    Unable to Pay for Housing in the Last Year: Not on file    Number of [place] in the Last Year: Not on file    Unstable Housing in the Last Year: No       Family History   Problem Relation Age of Onset    Arthritis Mother     Other Mother     Diabetes Father        Current Outpatient Medications   Medication Sig Dispense Refill    sulfamethoxazole-trimethoprim (BACTRIM DS) 800-160 MG per tablet Take 1 tablet by mouth 2 times daily for 10 days 20 tablet 0    meloxicam (MOBIC) 15 MG tablet Take 1 tablet by mouth daily 5 tablet 0    Ascorbic Acid (VITAMIN C) 100 MG CHEW Take by mouth       No current facility-administered medications for this visit. There is no immunization history on file for this patient.     Allergies   Allergen Reactions    Penicillins     Benadryl [Diphenhydramine] Nausea And Vomiting and Rash       Nurse Only on 02/10/2021   Component Date Value Ref Range Status    TSH 02/10/2021 3.13  0.27 - 4.20 uIU/mL Final    Cholesterol, Total 02/10/2021 191  0 - 199 mg/dL Final    Triglycerides 02/10/2021 115  0 - 150 mg/dL Final    HDL 02/10/2021 46  40 - 60 mg/dL Final    LDL Calculated 02/10/2021 122 (A)  <100 mg/dL Final    VLDL Cholesterol Calculated 02/10/2021 23  Not Established mg/dL Final    WBC 02/10/2021 5.9  4.0 - 11.0 K/uL Final    RBC 02/10/2021 5.26  4.20 - 5.90 M/uL Final    Hemoglobin 02/10/2021 15.5  13.5 - 17.5 g/dL Final    Hematocrit 02/10/2021 46.2  40.5 - 52.5 % Final    MCV 02/10/2021 87.7  80.0 - 100.0 fL Final    MCH 02/10/2021 29.5  26.0 - 34.0 pg Final    MCHC 02/10/2021 33.6  31.0 - 36.0 g/dL Final    RDW 02/10/2021 12.9  12.4 - 15.4 % Final    Platelets 00/82/4350 299  135 - 450 K/uL Final    MPV 02/10/2021 7.8  5.0 - 10.5 fL Final    Neutrophils % 02/10/2021 55.2  % Final    Lymphocytes % 02/10/2021 32.2  % Final    Monocytes % 02/10/2021 9.7  % Final    Eosinophils % 02/10/2021 2.2  % Final    Basophils % 02/10/2021 0.7  % Final    Neutrophils Absolute 02/10/2021 3.3  1.7 - 7.7 K/uL Final    Lymphocytes Absolute 02/10/2021 1.9  1.0 - 5.1 K/uL Final    Monocytes Absolute 02/10/2021 0.6  0.0 - 1.3 K/uL Final    Eosinophils Absolute 02/10/2021 0.1  0.0 - 0.6 K/uL Final    Basophils Absolute 02/10/2021 0.0  0.0 - 0.2 K/uL Final    Sodium 02/10/2021 137  136 - 145 mmol/L Final    Potassium 02/10/2021 4.7  3.5 - 5.1 mmol/L Final    Chloride 02/10/2021 100  99 - 110 mmol/L Final    CO2 02/10/2021 29  21 - 32 mmol/L Final    Anion Gap 02/10/2021 8  3 - 16 Final    Glucose 02/10/2021 89  70 - 99 mg/dL Final    BUN 02/10/2021 13  7 - 20 mg/dL Final    Creatinine 02/10/2021 1.0  0.9 - 1.3 mg/dL Final    GFR Non- 02/10/2021 >60  >60 Final    Comment: >60 mL/min/1.73m2 EGFR, calc. for ages 25 and older using the  MDRD formula (not corrected for weight), is valid for stable  renal function. GFR  02/10/2021 >60  >60 Final    Comment: Chronic Kidney Disease: less than 60 ml/min/1.73 sq.m. Kidney Failure: less than 15 ml/min/1.73 sq.m. Results valid for patients 18 years and older. Calcium 02/10/2021 9.7  8.3 - 10.6 mg/dL Final    Total Protein 02/10/2021 7.6  6.4 - 8.2 g/dL Final    Albumin 02/10/2021 4.7  3.4 - 5.0 g/dL Final    Albumin/Globulin Ratio 02/10/2021 1.6  1.1 - 2.2 Final    Total Bilirubin 02/10/2021 0.5  0.0 - 1.0 mg/dL Final    Alkaline Phosphatase 02/10/2021 87  40 - 129 U/L Final    ALT 02/10/2021 36  10 - 40 U/L Final    AST 02/10/2021 34  15 - 37 U/L Final    Globulin 02/10/2021 2.9  g/dL Final       Review of Systems   Genitourinary:  Positive for testicular pain (left). Musculoskeletal:  Positive for arthralgias.      /87 (Site: Left Upper Arm, Position: Sitting, Cuff Size: Large Adult)   Pulse 69   Temp 97.3 °F (36.3 °C) (Temporal)   Resp 16   Ht 6' (1.829 m)   Wt 193 lb (87.5 kg) SpO2 95%   BMI 26.18 kg/m²     Physical Exam  Vitals reviewed. Exam conducted with a chaperone present. Genitourinary:     Comments: Mild left testicle tenderness, possible let inguinal hernia      Plan   Diagnosis Orders   1. Acute pain of left shoulder  meloxicam (MOBIC) 15 MG tablet      2. Left testicular pain  sulfamethoxazole-trimethoprim (BACTRIM DS) 800-160 MG per tablet    Esther Palacio MD, Urology, [facility]      3. Left inguinal pain  US SCROTUM AND TESTICLES          Return if symptoms worsen or fail to improve. Prior to Visit Medications    Medication Sig Taking?  Authorizing Provider   sulfamethoxazole-trimethoprim (BACTRIM DS) 800-160 MG per tablet Take 1 tablet by mouth 2 times daily for 10 days Yes Chelsey Rolls, DO   meloxicam (MOBIC) 15 MG tablet Take 1 tablet by mouth daily Yes Chelsey Rolls, DO   Ascorbic Acid (VITAMIN C) 100 MG CHEW Take by mouth Yes Historical Provider, MD